# Patient Record
Sex: MALE | Race: WHITE | NOT HISPANIC OR LATINO | Employment: STUDENT | URBAN - METROPOLITAN AREA
[De-identification: names, ages, dates, MRNs, and addresses within clinical notes are randomized per-mention and may not be internally consistent; named-entity substitution may affect disease eponyms.]

---
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
## 2023-12-29 ENCOUNTER — LAB VISIT (OUTPATIENT)
Dept: LAB | Facility: HOSPITAL | Age: 18
End: 2023-12-29
Payer: COMMERCIAL

## 2023-12-29 ENCOUNTER — OFFICE VISIT (OUTPATIENT)
Dept: ALLERGY | Facility: CLINIC | Age: 18
End: 2023-12-29
Payer: COMMERCIAL

## 2023-12-29 VITALS — HEIGHT: 70 IN | BODY MASS INDEX: 30.73 KG/M2 | WEIGHT: 214.69 LBS

## 2023-12-29 DIAGNOSIS — R06.02 SHORTNESS OF BREATH: ICD-10-CM

## 2023-12-29 DIAGNOSIS — H10.423 SIMPLE CHRONIC CONJUNCTIVITIS OF BOTH EYES: ICD-10-CM

## 2023-12-29 DIAGNOSIS — J31.0 CHRONIC RHINITIS: ICD-10-CM

## 2023-12-29 DIAGNOSIS — J31.0 CHRONIC RHINITIS: Primary | ICD-10-CM

## 2023-12-29 PROCEDURE — 36415 COLL VENOUS BLD VENIPUNCTURE: CPT | Mod: PO | Performed by: ALLERGY & IMMUNOLOGY

## 2023-12-29 PROCEDURE — 1160F RVW MEDS BY RX/DR IN RCRD: CPT | Mod: CPTII,S$GLB,, | Performed by: ALLERGY & IMMUNOLOGY

## 2023-12-29 PROCEDURE — 99999 PR PBB SHADOW E&M-EST. PATIENT-LVL III: ICD-10-PCS | Mod: PBBFAC,,, | Performed by: ALLERGY & IMMUNOLOGY

## 2023-12-29 PROCEDURE — 1160F PR REVIEW ALL MEDS BY PRESCRIBER/CLIN PHARMACIST DOCUMENTED: ICD-10-PCS | Mod: CPTII,S$GLB,, | Performed by: ALLERGY & IMMUNOLOGY

## 2023-12-29 PROCEDURE — 3008F PR BODY MASS INDEX (BMI) DOCUMENTED: ICD-10-PCS | Mod: CPTII,S$GLB,, | Performed by: ALLERGY & IMMUNOLOGY

## 2023-12-29 PROCEDURE — 86003 ALLG SPEC IGE CRUDE XTRC EA: CPT | Mod: 59 | Performed by: ALLERGY & IMMUNOLOGY

## 2023-12-29 PROCEDURE — 99999 PR PBB SHADOW E&M-EST. PATIENT-LVL III: CPT | Mod: PBBFAC,,, | Performed by: ALLERGY & IMMUNOLOGY

## 2023-12-29 PROCEDURE — 1159F PR MEDICATION LIST DOCUMENTED IN MEDICAL RECORD: ICD-10-PCS | Mod: CPTII,S$GLB,, | Performed by: ALLERGY & IMMUNOLOGY

## 2023-12-29 PROCEDURE — 1159F MED LIST DOCD IN RCRD: CPT | Mod: CPTII,S$GLB,, | Performed by: ALLERGY & IMMUNOLOGY

## 2023-12-29 PROCEDURE — 99204 OFFICE O/P NEW MOD 45 MIN: CPT | Mod: S$GLB,,, | Performed by: ALLERGY & IMMUNOLOGY

## 2023-12-29 PROCEDURE — 86003 ALLG SPEC IGE CRUDE XTRC EA: CPT | Performed by: ALLERGY & IMMUNOLOGY

## 2023-12-29 PROCEDURE — 99204 PR OFFICE/OUTPT VISIT, NEW, LEVL IV, 45-59 MIN: ICD-10-PCS | Mod: S$GLB,,, | Performed by: ALLERGY & IMMUNOLOGY

## 2023-12-29 PROCEDURE — 3008F BODY MASS INDEX DOCD: CPT | Mod: CPTII,S$GLB,, | Performed by: ALLERGY & IMMUNOLOGY

## 2023-12-29 NOTE — PROGRESS NOTES
Subjective:       Patient ID: Kedar Ness is a 18 y.o. male.    Chief Complaint:  Allergic Rhinitis  and Breathing Problem      19 yo man presents for new patient evaluation of allergies. He states if he does not take daily zyrtec he has congestion and SOB in AM, constant runny nose, sneeze, itchy watery eyes, itchy nose. He notices this more at home in [place], seems fine at college. He has always been triggered by cats but there is no cat at his home, his brother has cat but not been there in awhile. He has had issues since start of December when came H ome from college. Has some chest tightness and SOB in AM and at night when lying down. Only known trigger is cat. No food, insect or latex allergy. No H/O asthma or eczema. No other medical issues but is having arthralgias so seeing rheum. Not her medical issues. No H/O ENT surgery        Environmental History: see history section for home environment  Review of Systems   HENT:  Positive for congestion, postnasal drip, rhinorrhea and sneezing. Negative for ear pain, facial swelling, nosebleeds, sinus pressure, sinus pain and sore throat.    Eyes:  Positive for discharge and itching. Negative for redness.   Respiratory:  Positive for chest tightness and shortness of breath. Negative for cough and wheezing.    Skin:  Negative for color change and rash.        Objective:      Physical Exam  Vitals and nursing note reviewed.   Constitutional:       General: He is not in acute distress.     Appearance: Normal appearance. He is not ill-appearing.   HENT:      Nose: No rhinorrhea.   Eyes:      General:         Right eye: No discharge.         Left eye: No discharge.      Conjunctiva/sclera: Conjunctivae normal.   Pulmonary:      Effort: Pulmonary effort is normal. No respiratory distress.   Abdominal:      General: There is no distension.   Skin:     General: Skin is warm and dry.      Findings: No erythema or rash.   Neurological:      Mental Status: He is alert and  oriented to person, place, and time.   Psychiatric:         Mood and Affect: Mood normal.         Behavior: Behavior normal.         Laboratory:   none performed   Assessment:       1. Chronic rhinitis    2. Simple chronic conjunctivitis of both eyes    3. Shortness of breath         Plan:       Advised rhinitis and conjunctivitis can be allergic vs chronic non allergic, will send immunocaps to eval  Increase cetirizine to 10 mg BID, if not better consider adding INS and allergy eye drop  Phone review    I spent a total of 45 minutes on the day of the visit.  This includes face to face time and non-face to face time preparing to see the patient (eg, review of tests), obtaining and/or reviewing separately obtained history, documenting clinical information in the electronic or other health record, independently interpreting results and communicating results to the patient/family/caregiver, or care coordinator.

## 2024-01-03 LAB
A ALTERNATA IGE QN: <0.1 KU/L
A FUMIGATUS IGE QN: <0.1 KU/L
ALLERGEN CHAETOMIUM GLOBOSUM IGE: <0.1 KU/L
ALLERGEN WALNUT TREE IGE: <0.1 KU/L
ALLERGEN WHITE PINE TREE IGE: <0.1 KU/L
BAHIA GRASS IGE QN: <0.1 KU/L
BALD CYPRESS IGE QN: <0.1 KU/L
BERMUDA GRASS IGE QN: <0.1 KU/L
C HERBARUM IGE QN: <0.1 KU/L
C LUNATA IGE QN: <0.1 KU/L
CAT DANDER IGE QN: 12.4 KU/L
CHAETOMIUM GLOB. CLASS: NORMAL
COMMON RAGWEED IGE QN: <0.1 KU/L
COTTONWOOD IGE QN: <0.1 KU/L
D FARINAE IGE QN: 36 KU/L
D PTERONYSS IGE QN: 28.9 KU/L
DEPRECATED A ALTERNATA IGE RAST QL: NORMAL
DEPRECATED A FUMIGATUS IGE RAST QL: NORMAL
DEPRECATED BAHIA GRASS IGE RAST QL: NORMAL
DEPRECATED BALD CYPRESS IGE RAST QL: NORMAL
DEPRECATED BERMUDA GRASS IGE RAST QL: NORMAL
DEPRECATED C HERBARUM IGE RAST QL: NORMAL
DEPRECATED C LUNATA IGE RAST QL: NORMAL
DEPRECATED CAT DANDER IGE RAST QL: ABNORMAL
DEPRECATED COMMON RAGWEED IGE RAST QL: NORMAL
DEPRECATED COTTONWOOD IGE RAST QL: NORMAL
DEPRECATED D FARINAE IGE RAST QL: ABNORMAL
DEPRECATED D PTERONYSS IGE RAST QL: ABNORMAL
DEPRECATED DOG DANDER IGE RAST QL: ABNORMAL
DEPRECATED ELDER IGE RAST QL: NORMAL
DEPRECATED ENGL PLANTAIN IGE RAST QL: NORMAL
DEPRECATED HORSE DANDER IGE RAST QL: ABNORMAL
DEPRECATED JOHNSON GRASS IGE RAST QL: NORMAL
DEPRECATED LONDON PLANE IGE RAST QL: NORMAL
DEPRECATED MUGWORT IGE RAST QL: NORMAL
DEPRECATED P NOTATUM IGE RAST QL: NORMAL
DEPRECATED PECAN/HICK TREE IGE RAST QL: NORMAL
DEPRECATED ROACH IGE RAST QL: ABNORMAL
DEPRECATED S ROSTRATA IGE RAST QL: NORMAL
DEPRECATED SALTWORT IGE RAST QL: NORMAL
DEPRECATED SILVER BIRCH IGE RAST QL: NORMAL
DEPRECATED TIMOTHY IGE RAST QL: NORMAL
DEPRECATED WEST RAGWEED IGE RAST QL: NORMAL
DEPRECATED WHITE OAK IGE RAST QL: NORMAL
DEPRECATED WILLOW IGE RAST QL: NORMAL
DOG DANDER IGE QN: 1.61 KU/L
ELDER IGE QN: <0.1 KU/L
ENGL PLANTAIN IGE QN: <0.1 KU/L
HORSE DANDER IGE QN: 2.73 KU/L
JOHNSON GRASS IGE QN: <0.1 KU/L
LONDON PLANE IGE QN: <0.1 KU/L
MUGWORT IGE QN: <0.1 KU/L
P NOTATUM IGE QN: <0.1 KU/L
PECAN/HICK TREE IGE QN: <0.1 KU/L
ROACH IGE QN: 0.19 KU/L
S ROSTRATA IGE QN: <0.1 KU/L
SALTWORT IGE QN: <0.1 KU/L
SILVER BIRCH IGE QN: <0.1 KU/L
TIMOTHY IGE QN: <0.1 KU/L
WALNUT TREE CLASS: NORMAL
WEST RAGWEED IGE QN: <0.1 KU/L
WHITE OAK IGE QN: <0.1 KU/L
WHITE PINE CLASS: NORMAL
WILLOW IGE QN: <0.1 KU/L

## 2024-01-08 ENCOUNTER — TELEPHONE (OUTPATIENT)
Dept: ALLERGY | Facility: CLINIC | Age: 19
End: 2024-01-08
Payer: COMMERCIAL

## 2024-01-08 NOTE — TELEPHONE ENCOUNTER
Please let pt know he is allergic to cat, dog, horse and dust mites.  He needs dust mote avoidance-  Dust mites are microscopic insects, so small you cant see them and they cant bite but they live in our beds, carpet and upholstered furniture. They have nothing to do with cleanliness. We are most exposed by our beds, at night breathe this allergen in all night. She would benefit from Dust mite avoidance - zippered covers over pillows, mattress and box springs.  Can purchase at [employer], [employer], [employer], or www.[employer].   Place sheets over allergy covers   Wash sheets in hot water weekly

## 2024-06-18 ENCOUNTER — TELEPHONE (OUTPATIENT)
Dept: FAMILY MEDICINE | Facility: CLINIC | Age: 19
End: 2024-06-18
Payer: COMMERCIAL

## 2024-06-18 NOTE — TELEPHONE ENCOUNTER
----- Message from Pro Shaw sent at 6/18/2024 10:34 AM CDT -----  Contact: Pt. Portal  .Type:  Sooner Apoointment Request    Caller is requesting a sooner appointment.  Caller declined first available appointment listed below.  Caller will not accept being placed on the waitlist and is requesting a message be sent to doctor.  Name of Caller:pt  When is the first available appointment?08/22/2024  Symptoms: annual  Would the patient rather a call back or a response via [facility]?  Call back  Best Call Back Number:[phone]  Additional Information: Rubi scheduled me for an appt in late aug but i go back to school around aug 5. can i come before then?   6/18/2024 - 8/9/2024

## 2024-06-18 NOTE — TELEPHONE ENCOUNTER
Attempted to reach out to pt in regards to get him schedule to an earlier appointment. Pt did not answer and was unable to leave voicemail

## 2024-07-01 ENCOUNTER — HOSPITAL ENCOUNTER (OUTPATIENT)
Dept: RADIOLOGY | Facility: HOSPITAL | Age: 19
Discharge: HOME | End: 2024-07-01
Attending: STUDENT IN AN ORGANIZED HEALTH CARE EDUCATION/TRAINING PROGRAM
Payer: COMMERCIAL

## 2024-07-01 ENCOUNTER — OFFICE VISIT (OUTPATIENT)
Dept: ORTHOPEDICS | Facility: CLINIC | Age: 19
End: 2024-07-01
Payer: COMMERCIAL

## 2024-07-01 VITALS — WEIGHT: 214.75 LBS | HEIGHT: 70 IN | BODY MASS INDEX: 30.74 KG/M2

## 2024-07-01 DIAGNOSIS — M25.551 BILATERAL HIP PAIN: ICD-10-CM

## 2024-07-01 DIAGNOSIS — M25.551 BILATERAL HIP PAIN: Primary | ICD-10-CM

## 2024-07-01 DIAGNOSIS — G89.29 CHRONIC NECK PAIN: ICD-10-CM

## 2024-07-01 DIAGNOSIS — M25.511 BILATERAL SHOULDER PAIN, UNSPECIFIED CHRONICITY: ICD-10-CM

## 2024-07-01 DIAGNOSIS — M25.511 BILATERAL SHOULDER PAIN, UNSPECIFIED CHRONICITY: Primary | ICD-10-CM

## 2024-07-01 DIAGNOSIS — M25.512 BILATERAL SHOULDER PAIN, UNSPECIFIED CHRONICITY: Primary | ICD-10-CM

## 2024-07-01 DIAGNOSIS — M25.552 BILATERAL HIP PAIN: Primary | ICD-10-CM

## 2024-07-01 DIAGNOSIS — M54.2 CHRONIC NECK PAIN: ICD-10-CM

## 2024-07-01 DIAGNOSIS — M25.512 BILATERAL SHOULDER PAIN, UNSPECIFIED CHRONICITY: ICD-10-CM

## 2024-07-01 DIAGNOSIS — M24.80 GENERALIZED ARTICULAR HYPERMOBILITY: Primary | ICD-10-CM

## 2024-07-01 DIAGNOSIS — M25.552 BILATERAL HIP PAIN: ICD-10-CM

## 2024-07-01 PROCEDURE — 73030 X-RAY EXAM OF SHOULDER: CPT | Mod: 26,50,, | Performed by: RADIOLOGY

## 2024-07-01 PROCEDURE — 73521 X-RAY EXAM HIPS BI 2 VIEWS: CPT | Mod: TC,PO

## 2024-07-01 PROCEDURE — 3008F BODY MASS INDEX DOCD: CPT | Mod: CPTII,S$GLB,, | Performed by: STUDENT IN AN ORGANIZED HEALTH CARE EDUCATION/TRAINING PROGRAM

## 2024-07-01 PROCEDURE — 1159F MED LIST DOCD IN RCRD: CPT | Mod: CPTII,S$GLB,, | Performed by: STUDENT IN AN ORGANIZED HEALTH CARE EDUCATION/TRAINING PROGRAM

## 2024-07-01 PROCEDURE — 99204 OFFICE O/P NEW MOD 45 MIN: CPT | Mod: S$GLB,,, | Performed by: STUDENT IN AN ORGANIZED HEALTH CARE EDUCATION/TRAINING PROGRAM

## 2024-07-01 PROCEDURE — 1160F RVW MEDS BY RX/DR IN RCRD: CPT | Mod: CPTII,S$GLB,, | Performed by: STUDENT IN AN ORGANIZED HEALTH CARE EDUCATION/TRAINING PROGRAM

## 2024-07-01 PROCEDURE — 73030 X-RAY EXAM OF SHOULDER: CPT | Mod: TC,50,PO

## 2024-07-01 PROCEDURE — 99999 PR PBB SHADOW E&M-EST. PATIENT-LVL IV: CPT | Mod: PBBFAC,,, | Performed by: STUDENT IN AN ORGANIZED HEALTH CARE EDUCATION/TRAINING PROGRAM

## 2024-07-01 PROCEDURE — 73521 X-RAY EXAM HIPS BI 2 VIEWS: CPT | Mod: 26,,, | Performed by: RADIOLOGY

## 2024-07-01 NOTE — PATIENT INSTRUCTIONS
Assessment:  Kedar Ness is a 19 y.o. male No chief complaint on file.      Encounter Diagnoses   Name Primary?    Generalized articular hypermobility Yes    Chronic neck pain         Plan:  Referral to Back and Sp[facility] in [place]  Referral to Genetics Department  Referral to physical therapy at [facility] [place]  An order for Physical Therapy within the [facility] system was placed today.  Please expect a call from our Centralized Scheduling number, [phone].  If you do not hear from them in the next few days, please call our local PT department directly at [phone].    Patient may use over the counter lidocaine patches as needed for pain.  Patient may use heat as needed for pain every 2 hours for 15 minutes  Apply topical diclofenac (Voltaren) up to 4 times a day to the affected area.  It can be bought over the counter at any local pharmacy.    Follow up as needed        A referral has been placed to our Back and Spine Department. A member of their staff will reach out to you to get an appointment scheduled with the appropriate provider. You may also contact their department using the contact numbers below:    [place]: [phone]  [place]: [phone]  [facility]: [phone]    If you would like more information on [facility] department, please use the following link: Back [facility] Spine      Follow-up: as needed.    Thank you for choosing [facility] [place] [facility] and Dr. Ronan Rees for your orthopedic & sports medicine care. It is our goal to provide you with exceptional care that will help keep you healthy, active, and get you back in the game.    Please do not hesitate to reach out to us via email, phone, or MyChart with any questions, concerns, or feedback.    If you felt that you received exemplary care today, please consider leaving us feedback on [employer] at:  [email or web address]    If you are experiencing  pain/discomfort ,or have questions after 5pm and would like to be connected to the [facility] [place] on-call team, please call this number and specify which Sports Medicine provider is treating you: [phone]

## 2024-07-01 NOTE — PROGRESS NOTES
Patient ID: Kedar Ness  YOB: 2005  MRN: [id number]    Chief Complaint: bilateral shoulder, hip, upper back jaya      Referred By: self    History of Present Illness: Kedar Ness is a right-hand dominant 19 y.o. male who presents today with shoulder, hip pain.  New pt in today for upper neck and shoulder pain that has been ongoing for several years. Feels like he's straining his back when reaching over to grab things. Upper back hurts all the time. Denies any injuries or pain today.  No specific event, fall, injury or trauma. No specific dislocation events. Active male, lifts weights     The patient is active in none.  Occupation: College student      Past Medical History:   Past Medical History:   Diagnosis Date    Anxiety     Central auditory processing disorder      History reviewed. No pertinent surgical history.  Family History   Problem Relation Name Age of Onset    Hyperlipidemia Mother      Strabismus Father      Heart disease Father      Alpha-1 antitrypsin deficiency Brother      Amblyopia Neg Hx      Blindness Neg Hx      Cancer Neg Hx      Cataracts Neg Hx      Diabetes Neg Hx      Glaucoma Neg Hx      Hypertension Neg Hx      Macular degeneration Neg Hx      Retinal detachment Neg Hx      Stroke Neg Hx      Thyroid disease Neg Hx       Social History     Socioeconomic History    Marital status: Single   Tobacco Use    Smoking status: Never     Passive exposure: Never    Smokeless tobacco: Never   Substance and Sexual Activity    Alcohol use: No     Comment: socially    Drug use: No    Sexual activity: Never   Social History Narrative    Lives with parents.     Senior [facility]     College : [employer] : Biology : Neuroscience : Research     Hobbies : None        Review of patient's allergies indicates:  No Known Allergies    Physical Exam:   Body mass index is 30.81 kg/m².    GENERAL: Well appearing, in no acute distress.  HEAD: Normocephalic and atraumatic.  ENT: External ears and nose  grossly normal.  EYES: EOMI bilaterally  PULMONARY: Respirations are grossly even and non-labored.  NEURO: Awake, alert, and oriented x 3.  SKIN: No obvious rashes appreciated.  PSYCH: Mood & affect are appropriate.    Detailed MSK exam:     Left shoulder exam:   -ROM: abduction 130, forward flexion 130, external rotation 90, internal rotation 80  -empty can test negative, resisted ER negative, belly press negative  -padron test negative, neers test negative, whipple test negative  -biceps load test negative, yerguson test negative, [place]'s test negative  -sensation intact, pulses 2+  -TTP: none    Right shoulder exam:   -ROM: abduction 130, forward flexion 130, external rotation 90, internal rotation 80  -empty can test negative, resisted ER negative, belly press negative  -padron test negative, neers test negative, whipple test negative  -biceps load test negative, yerguson test negative, [place]'s test negative  -sensation intact, pulses 2+  -TTP: none      Imaging:  X-Ray Foot Complete Left  Narrative: EXAMINATION:  XR FOOT COMPLETE 3 VIEW LEFT    CLINICAL HISTORY:  numb left big toe;.  Anesthesia of skin    TECHNIQUE:  AP, lateral and oblique views of the left foot were performed.    COMPARISON:  None    FINDINGS:  No fracture or dislocation.  Lisfranc articulation is congruent.  Cartilage spaces are maintained.  Pes planus deformity noted.  Soft tissues are unremarkable.  Impression: No radiographically evident acute osseous abnormality.    Electronically signed by: Urbano Ha  Date:    11/05/2019  Time:    17:07        Relevant imaging results were reviewed and interpreted by me and per my read shows no acute abnormalities.  This was discussed with the patient and / or family today.     Assessment:  Kedar Ness is a 19 y.o. male presenting with chronic neck/upper back pain.   History, physical and radiographs are consistent with a likely diagnosis of chronic neck pain, hypermobility.   Plan: referral  to back and spine as well as genetics. PT referral. Continue conservative management for pain.   Follow up as needed. All questions answered.      Generalized articular hypermobility  -     Ambulatory referral/consult to Physical/Occupational Therapy; Future; Expected date: 07/08/2024  -     Ambulatory referral/consult to Genetics; Future; Expected date: 07/08/2024  -     Ambulatory referral/consult to Back & Spine Clinic; Future; Expected date: 07/08/2024    Chronic neck pain  -     Ambulatory referral/consult to Physical/Occupational Therapy; Future; Expected date: 07/08/2024  -     Ambulatory referral/consult to Genetics; Future; Expected date: 07/08/2024  -     Ambulatory referral/consult to Back & Spine Clinic; Future; Expected date: 07/08/2024             A copy of today's visit note has been sent to the referring provider.     Electronically signed:  Ronan Rees MD, MPH  07/01/2024  12:18 PM

## 2024-07-22 ENCOUNTER — OFFICE VISIT (OUTPATIENT)
Dept: PAIN MEDICINE | Facility: CLINIC | Age: 19
End: 2024-07-22
Payer: COMMERCIAL

## 2024-07-22 VITALS
BODY MASS INDEX: 30.77 KG/M2 | HEIGHT: 70 IN | WEIGHT: 214.94 LBS | SYSTOLIC BLOOD PRESSURE: 114 MMHG | HEART RATE: 55 BPM | DIASTOLIC BLOOD PRESSURE: 70 MMHG

## 2024-07-22 DIAGNOSIS — G89.29 CHRONIC NECK PAIN: ICD-10-CM

## 2024-07-22 DIAGNOSIS — M24.80 GENERALIZED ARTICULAR HYPERMOBILITY: ICD-10-CM

## 2024-07-22 DIAGNOSIS — M54.2 CHRONIC NECK PAIN: ICD-10-CM

## 2024-07-22 PROCEDURE — 3078F DIAST BP <80 MM HG: CPT | Mod: CPTII,S$GLB,, | Performed by: PHYSICIAN ASSISTANT

## 2024-07-22 PROCEDURE — 99203 OFFICE O/P NEW LOW 30 MIN: CPT | Mod: S$GLB,,, | Performed by: PHYSICIAN ASSISTANT

## 2024-07-22 PROCEDURE — 3008F BODY MASS INDEX DOCD: CPT | Mod: CPTII,S$GLB,, | Performed by: PHYSICIAN ASSISTANT

## 2024-07-22 PROCEDURE — 99999 PR PBB SHADOW E&M-EST. PATIENT-LVL III: CPT | Mod: PBBFAC,,, | Performed by: PHYSICIAN ASSISTANT

## 2024-07-22 PROCEDURE — 3074F SYST BP LT 130 MM HG: CPT | Mod: CPTII,S$GLB,, | Performed by: PHYSICIAN ASSISTANT

## 2024-07-22 NOTE — PROGRESS NOTES
[facility] New Patient Evaluation      Referred by: Dr. Ronan Rees    PCP: Bill Dubon MD    CC:   Chief Complaint   Patient presents with    Initial Visit     Generalized articular hypermobility [M24.80]; Chronic neck pain [M54.2, G89.29] mostly his upper and lower back and his neck area. Pain is getting worse as the wks and days.            HPI:   Kedar Ness is a 19 y.o. year old male patient who has a past medical history of Anxiety and Central auditory processing disorder. He presents in referral from Dr. Ronan Rees for neck and back pain.  It is pain is mostly felt midline midthoracic region.  It traveled superiorly up to the upper thoracic and cervical region.  Pain travels inferiorly to the lower lumbar region.  Denies any radicular chest wall pain.  He denies any persistent radicular arm or leg pain, numbness, tingling.  He does not generally take any medications for the pain experienced.  He did undergo physical therapy in 2019 and has continued with a regular home exercise program since then.  Was recently referred back to physical therapy, but has not started yet.  Has not had any other treatments.  Primary care has also referred him to a  for evaluation of possible hypermobility/EDS.    Denies bowel/ bladder incontinence.    Past and current medications:  Antineuropathics:  NSAIDs:  Antidepressants:  Muscle relaxers:  Opioids:  Antiplatelets/Anticoagulants:    Physical Therapy/ Chiropractic care:  2019 PT  Home exercise  Recently referred to PT, but has not started yet    Pain Intervention History:  none    Past Spine Surgical History:  none        History:  No current outpatient medications on file.    Past Medical History:   Diagnosis Date    Anxiety     Central auditory processing disorder        No past surgical history on file.    Family History   Problem Relation Name Age of Onset    Hyperlipidemia Mother      Strabismus Father      Heart disease Father       "Alpha-1 antitrypsin deficiency Brother      Amblyopia Neg Hx      Blindness Neg Hx      Cancer Neg Hx      Cataracts Neg Hx      Diabetes Neg Hx      Glaucoma Neg Hx      Hypertension Neg Hx      Macular degeneration Neg Hx      Retinal detachment Neg Hx      Stroke Neg Hx      Thyroid disease Neg Hx         Social History     Socioeconomic History    Marital status: Single   Tobacco Use    Smoking status: Never     Passive exposure: Never    Smokeless tobacco: Never   Substance and Sexual Activity    Alcohol use: No     Comment: socially    Drug use: No    Sexual activity: Never   Social History Narrative    Lives with parents.     Senior [facility]     College : [employer] : Biology : Neuroscience : Research     Hobbies : None        Review of patient's allergies indicates:  No Known Allergies    Labs:  No results found for: "LABA1C", "HGBA1C"    No results found for: "WBC", "HGB", "HCT", "MCV", "PLT"        Review of Systems:  Nec k pain.  Thoracic back pain.  Lumbar back pain. .  Balance of review of systems is negative.    Physical Exam:  Vitals:    07/22/24 1312   BP: 114/70   Pulse: (!) 55   Weight: 97.5 kg (214 lb 15.2 oz)   Height: 5' 10" (1.778 m)   PainSc:   3   PainLoc: Back     Body mass index is 30.84 kg/m².    Pain disability index:      7/22/2024     1:13 PM   Last 3 PDI Scores   Pain Disability Index (PDI) 15       Gen: NAD  Psych: mood appropriate for given condition  HEENT: eyes anicteric   CV: RRR, 2+ radial pulse  Respiratory: non-labored, no signs of respiratory distress  Abd: non-distended  Skin: warm, dry and intact.  Gait: Able to heel walk, toe walk. No antalgic gait.     Coordination:   Tandem walking coordination: normal    Cervical spine: ROM is full in flexion, extension and lateral rotation without increased pain.  Spurling's maneuver causes no neck pain to either side.  Myofascial exam: No Tenderness to palpation across cervical paraspinous region bilaterally.    Lumbar spine:  Lumbar spine: ROM is " full with flexion extension and oblique extension with no increased pain.    Shade's test causes no increased pain on either side.    Supine straight leg raise is negative bilaterally.    Internal and external rotation of the hip causes no increased pain on either side.  Myofascial exam: No tenderness to palpation across lumbar paraspinous muscles. No tenderness to palpation over the bilateral greater trochanters and bilateral SI joint    Sensory:  Intact and symmetrical to light touch in C4-T1 dermatomes bilaterally. Intact and symmetrical to light touch in L1-S1 dermatomes bilaterally.    Motor:    Right Left   C4 Shoulder Abduction  5  5   C5 Elbow Flexion    5  5   C6 Wrist Extension  5  5   C7 Elbow Extension   5  5   C8/T1 Hand Intrinsics   5  5        Right Left   L2/3 Iliacus Hip flexion  5  5   L3/4 Qudratus Femoris Knee Extension  5  5   L4/5 Tib Anterior Ankle Dorsiflexion   5  5   L5/S1 Extensor Hallicus Longus Great toe extension  5  5   S1/S2 Gastroc/Soleus Plantar Flexion  5  5      Right Left   Triceps DTR 2+ 2+   Biceps DTR 2+ 2+   Brachioradialis DTR 2+ 2+   Patellar DTR 2+ 2+   Achilles DTR 2+ 2+   Raines Absent  Absent   Clonus Absent Absent   Babinski Absent Absent       Imaging:    Xray bilateral hips 7/1/24:  Joint spaces are relatively well-preserved. No acute fracture or dislocation. No acute bony abnormality.     Xray bilateral shoulder 7/1/24:  Within normal limits.  No fracture, dislocation or arthritic change.       Assessment:   Kedar Ness is a 19 y.o. year old male patient who has a past medical history of Anxiety and Central auditory processing disorder. He presents in referral from Dr. Ronan Rees for midline midthoracic back pain with radiation to the neck and lower lumbar region.  No radiculopathy.  No neurological deficits.  Pain pattern and exam most consistent with myofascial/mechanical back pain.    Plan:  - to help with myofascial/mechanical thoracic, cervical,  lumbar region pain, strongly recommend good posture and regular daily exercise.  Swimming, walking, stretching, biking or the best forms of exercise and strongly encouraged.  I do recommend he consider further physical therapy.  He last went in 2019 and could benefit from another course of therapy at this time.  He is interested in further PT and states he will schedule.  - he was recently referred to a  for evaluation of hypermobility/possible UDS.  He should keep this referral.  If you would like further assessment, 2nd opinion, he could see the [facility] hypermobility clinic.    Problem List Items Addressed This Visit    None  Visit Diagnoses       Generalized articular hypermobility        Chronic neck pain                Follow Up: RTC as needed    : Reviewed and consistent with medication use as prescribed.    Thank you for referring this interesting patient, and I look forward to continuing to collaborate in his care.        Kitty Acosta PA-C  [facility]

## 2024-07-29 NOTE — PROGRESS NOTES
[facility]  Physical Therapy Initial Evaluation    Name: Kedar Ness  Clinic Number: [id number]    Therapy Diagnosis: No diagnosis found.  Physician: Ronan Rees MD    Physician Orders: PT Eval and Treat   Medical Diagnosis from Referral:  Generalized articular hypermobility [M24.80], Chronic neck pain   Evaluation Date: 7/30/2024  Authorization Period Expiration: 7/1/25  Plan of Care Expiration: 9/23/24  Visit # / Visits authorized: 1/ 1  FOTO: 63; goal: 72 by visit 10    Time In: 1:10  Time Out: 1:55  Total Billable Time: 45 minutes    Precautions: Standard    Subjective   Date of onset: 1-2 years, but worse more recently  History of current condition - Kedar reports: he's had back pain for a couple of years, but worse more recently.  He states he has pain with looking down to do dishes and look at phone.    Weight lifting: no overhead press due to shoulder pain, lat stretches     Medical History:   Past Medical History:   Diagnosis Date    Anxiety     Central auditory processing disorder        Surgical History:   Kedar Ness  has no past surgical history on file.    Medications:   Kedar currently has no medications in their medication list.    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)  Prior Level of Function: I with ADLs  Current Level of Function: cervical flexion for washing dishes and reading, cervical extension    Pain:  Current 1/10, worst 5/10, best 0/10   Location:  central thoracic spine and radiates to neck and lower back  Description: Aching  Aggravating Factors: cervical flexion and extension  Easing Factors: bracing with UE and relaxing    Pts goals: decreased back pain, learn HEP to prevent pain    Objective     Observation: pt is pleasant and  cooperative    Posture: fwd head, rounded shoulders, sloped shoulders    Cervical Range of Motion:    Degrees Pain   Flexion 60 no     Extension 70 Pain to neck     Right Rotation 62 no     Left Rotation 58 no     Right Side Bending 44 no   Left Side Bending 42 no      Shoulder Range of Motion:   Shoulder Right Left   Flexion wfl wfl   Abduction wfl wfl   ER Contra scap Contra scap   IR Contra scap Contra scap     Strength:  Cervical MMT   Flexion 4+/5   Extension 5/5   Right Side Bend 5/5   Left Side Bend 5/5     Upper Extremity Strength  (R) UE  (L) UE    Shoulder flexion: 5/5 Shoulder flexion: 5/5   Shoulder Abduction: 5/5 Shoulder abduction: 5/5   Shoulder ER 5/5 Shoulder ER 5/5   Shoulder IR 5/5 Shoulder IR 5/5   Lower Trap 4-/5 Lower Trap 4/5   Middle Trap 4/5 Middle Trap 4+/5   Rhomboids 5/5 Rhomboids 4+/5     Thoracic:   Flexion WNL, no pain  Ext: 75%, pain to T5  R SB: WNL, but pulling to L side  L SB: WNL      Special Tests:  Sharp-Jean Pierre -   Lateral Flexion Alar Ligament intact       Joint Mobility: hypermobility throughout cspine      Palpation: tightness to B UT and levator scaps      Sensation: grossly intact to light touch      Intake Outcome Measure for FOTO neck Survey  Therapist reviewed FOTO scores for Kedar Ness on 7/30/2024.  FOTO report- see Media section or FOTO account episode details.  Intake Score : 63%       TREATMENT   Treatment Time In: 1:40  Treatment Time Out: 1:55  Total Treatment time separate from Evaluation: 15 minutes    Kedar participated in neuromuscular re-education activities to improve: Kinesthetic, Sense, Proprioception, and Posture for 15 minutes. The following activities were included:  Seated cervical retraction   Prone sh ext off EOM 3# x20  Prone horiz abd EOM 3# x20  Prone Y x10  Sh ext 10# x15 on CC    May add: Bruegger's, scap clocks, lateral wall walk, pec stretch    Home Exercises and Patient Education Provided    Education provided:   - role of PT  -importance  of periscap/postural strengthening  -postural education for sitting with upright posture  Pt gave verbal understanding to all education provided     Written Home Exercises Provided: yes.  Exercises were reviewed and Kedar was able to demonstrate them prior to the end of the session.  Kedar demonstrated good  understanding of the education provided.     See EMR under Patient Instructions for exercises provided  7/30/24 .    Assessment   Kedar is a 19 y.o. male referred to outpatient Physical Therapy with a medical diagnosis of  Generalized articular hypermobility [M24.80], Chronic neck pain . Pt presents with thoracic back pain and neck pain, decreased postural strength and stabilization.  He tolerated periscap exercises well, but muscle fatigue noted.  He will benefit from postural strengthening, stabilization, core strengthening, postural education.    Pt prognosis is Good.   Pt will benefit from skilled outpatient Physical Therapy to address the deficits stated above and in the chart below, provide pt/family education, and to maximize pt's level of independence.     Plan of care discussed with patient: Yes  Pt's spiritual, cultural and educational needs considered and patient is agreeable to the plan of care and goals as stated below:     Anticipated Barriers for therapy: none    Medical Necessity is demonstrated by the following  History  Co-morbidities and personal factors that may impact the plan of care [] LOW: no personal factors / co-morbidities  [x] MODERATE: 1-2 personal factors / co-morbidities  [] HIGH: 3+ personal factors / co-morbidities    Moderate / High Support Documentation:   Co-morbidities affecting plan of care: possible EDS    Personal Factors:   no deficits     Examination  Body Structures and Functions, activity limitations and participation restrictions that may impact the plan of care [] LOW: addressing 1-2 elements  [x] MODERATE: 3+ elements  [] HIGH: 4+ elements (please support  below)    Moderate / High Support Documentation: pain, decreased strength, decreased thoracic extension ROM     Clinical Presentation [x] LOW: stable  [] MODERATE: Evolving  [] HIGH: Unstable     Decision Making/ Complexity Score: low         GOALS: Short Term Goals: 4 weeks (progressing, not met)  1.Report decreased back pain  <   / =  3  /10 at its worst  to increase tolerance for ADLs  2. Pt will demonstrate understanding of proper sitting posture for increased ease with ADLs.  3. Increased R UE strength by 1/3 muscle grade in all deficient planes to increase tolerance for ADL and work activities.  4.  Increased L UE strength by 1/3 muscle grade in all deficient planes to increase tolerance for ADL and work activities.  5. Pt to tolerate HEP to improve ROM and independence with ADL's  6. Increased strength  for lower traps/middle traps/rhomboids by 1/3 muscle grade each to increase tolerance for ADL and improve posture.    Long Term Goals: 6-8 weeks (progressing, not met)  1.Report decreased thoracic back pain  <   / =  2  /10  to increase tolerance for ADLs  2.pt will demonstrate understanding of proper form for exercises at gym to prevent pain.  3.Increased R UE strength by 1 muscle grade in all deficient planes  to increase tolerance for ADL and work activities.  4. Increased L UE strength by 1 muscle grade in all deficient planes  to increase tolerance for ADL and work activities.  5. Pt to be Independent with HEP to improve ROM and independence with ADL's  6. Increased MMT  for lower traps/middle traps/rhomboids by 1 muscle grade each to increase tolerance for ADL and improve posture.        Plan   Plan of care Certification: 7/30/2024 to 9/23/24.    Outpatient Physical Therapy 2 times weekly for 8 weeks to include the following interventions: Electrical Stimulation  , Gait Training, Manual Therapy, Moist Heat/ Ice, Neuromuscular Re-ed, Patient Education, Self Care, Therapeutic Activities, Therapeutic  Exercise, and dry needling .     Vicki Corrigan, PT

## 2024-07-30 ENCOUNTER — CLINICAL SUPPORT (OUTPATIENT)
Dept: REHABILITATION | Facility: HOSPITAL | Age: 19
End: 2024-07-30
Attending: STUDENT IN AN ORGANIZED HEALTH CARE EDUCATION/TRAINING PROGRAM
Payer: COMMERCIAL

## 2024-07-30 DIAGNOSIS — M54.2 CHRONIC NECK PAIN: ICD-10-CM

## 2024-07-30 DIAGNOSIS — M62.81 MUSCLE WEAKNESS: ICD-10-CM

## 2024-07-30 DIAGNOSIS — M24.80 GENERALIZED ARTICULAR HYPERMOBILITY: ICD-10-CM

## 2024-07-30 DIAGNOSIS — G89.29 CHRONIC MIDLINE THORACIC BACK PAIN: Primary | ICD-10-CM

## 2024-07-30 DIAGNOSIS — G89.29 CHRONIC NECK PAIN: ICD-10-CM

## 2024-07-30 DIAGNOSIS — M54.6 CHRONIC MIDLINE THORACIC BACK PAIN: Primary | ICD-10-CM

## 2024-07-30 PROCEDURE — 97112 NEUROMUSCULAR REEDUCATION: CPT | Mod: PN

## 2024-07-30 PROCEDURE — 97161 PT EVAL LOW COMPLEX 20 MIN: CPT | Mod: PN

## 2024-07-30 NOTE — PLAN OF CARE
[facility]  Physical Therapy Initial Evaluation    Name: Kedar Ness  Clinic Number: [id number]    Therapy Diagnosis: No diagnosis found.  Physician: Ronan Rees MD    Physician Orders: PT Eval and Treat   Medical Diagnosis from Referral:  Generalized articular hypermobility [M24.80], Chronic neck pain   Evaluation Date: 7/30/2024  Authorization Period Expiration: 7/1/25  Plan of Care Expiration: 9/23/24  Visit # / Visits authorized: 1/ 1  FOTO: 63; goal: 72 by visit 10    Time In: 1:10  Time Out: 1:55  Total Billable Time: 45 minutes    Precautions: Standard    Subjective   Date of onset: 1-2 years, but worse more recently  History of current condition - Kedar reports: he's had back pain for a couple of years, but worse more recently.  He states he has pain with looking down to do dishes and look at phone.    Weight lifting: no overhead press due to shoulder pain, lat stretches     Medical History:   Past Medical History:   Diagnosis Date    Anxiety     Central auditory processing disorder        Surgical History:   Kedar Ness  has no past surgical history on file.    Medications:   Kedar currently has no medications in their medication list.    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)  Prior Level of Function: I with ADLs  Current Level of Function: cervical flexion for washing dishes and reading, cervical extension    Pain:  Current 1/10, worst 5/10, best 0/10   Location:  central thoracic spine and radiates to neck and lower back  Description: Aching  Aggravating Factors: cervical flexion and extension  Easing Factors: bracing with UE and relaxing    Pts goals: decreased back pain, learn HEP to prevent pain    Objective     Observation: pt is pleasant and  cooperative    Posture: fwd head, rounded shoulders, sloped shoulders    Cervical Range of Motion:    Degrees Pain   Flexion 60 no     Extension 70 Pain to neck     Right Rotation 62 no     Left Rotation 58 no     Right Side Bending 44 no   Left Side Bending 42 no      Shoulder Range of Motion:   Shoulder Right Left   Flexion wfl wfl   Abduction wfl wfl   ER Contra scap Contra scap   IR Contra scap Contra scap     Strength:  Cervical MMT   Flexion 4+/5   Extension 5/5   Right Side Bend 5/5   Left Side Bend 5/5     Upper Extremity Strength  (R) UE  (L) UE    Shoulder flexion: 5/5 Shoulder flexion: 5/5   Shoulder Abduction: 5/5 Shoulder abduction: 5/5   Shoulder ER 5/5 Shoulder ER 5/5   Shoulder IR 5/5 Shoulder IR 5/5   Lower Trap 4-/5 Lower Trap 4/5   Middle Trap 4/5 Middle Trap 4+/5   Rhomboids 5/5 Rhomboids 4+/5     Thoracic:   Flexion WNL, no pain  Ext: 75%, pain to T5  R SB: WNL, but pulling to L side  L SB: WNL      Special Tests:  Sharp-Jean Pierre -   Lateral Flexion Alar Ligament intact       Joint Mobility: hypermobility throughout cspine      Palpation: tightness to B UT and levator scaps      Sensation: grossly intact to light touch      Intake Outcome Measure for FOTO neck Survey  Therapist reviewed FOTO scores for Kedar Ness on 7/30/2024.  FOTO report- see Media section or FOTO account episode details.  Intake Score : 63%       TREATMENT   Treatment Time In: 1:40  Treatment Time Out: 1:55  Total Treatment time separate from Evaluation: 15 minutes    Kedar participated in neuromuscular re-education activities to improve: Kinesthetic, Sense, Proprioception, and Posture for 15 minutes. The following activities were included:  Seated cervical retraction   Prone sh ext off EOM 3# x20  Prone horiz abd EOM 3# x20  Prone Y x10  Sh ext 10# x15 on CC    May add: Bruegger's, scap clocks, lateral wall walk, pec stretch    Home Exercises and Patient Education Provided    Education provided:   - role of PT  -importance  of periscap/postural strengthening  -postural education for sitting with upright posture  Pt gave verbal understanding to all education provided     Written Home Exercises Provided: yes.  Exercises were reviewed and Kedar was able to demonstrate them prior to the end of the session.  Kedar demonstrated good  understanding of the education provided.     See EMR under Patient Instructions for exercises provided 7/30/24.    Assessment   Kedar is a 19 y.o. male referred to outpatient Physical Therapy with a medical diagnosis of  Generalized articular hypermobility [M24.80], Chronic neck pain . Pt presents with thoracic back pain and neck pain, decreased postural strength and stabilization.  He tolerated periscap exercises well, but muscle fatigue noted.  He will benefit from postural strengthening, stabilization, core strengthening, postural education.    Pt prognosis is Good.   Pt will benefit from skilled outpatient Physical Therapy to address the deficits stated above and in the chart below, provide pt/family education, and to maximize pt's level of independence.     Plan of care discussed with patient: Yes  Pt's spiritual, cultural and educational needs considered and patient is agreeable to the plan of care and goals as stated below:     Anticipated Barriers for therapy: none    Medical Necessity is demonstrated by the following  History  Co-morbidities and personal factors that may impact the plan of care [] LOW: no personal factors / co-morbidities  [x] MODERATE: 1-2 personal factors / co-morbidities  [] HIGH: 3+ personal factors / co-morbidities    Moderate / High Support Documentation:   Co-morbidities affecting plan of care: possible EDS    Personal Factors:   no deficits     Examination  Body Structures and Functions, activity limitations and participation restrictions that may impact the plan of care [] LOW: addressing 1-2 elements  [x] MODERATE: 3+ elements  [] HIGH: 4+ elements (please support  below)    Moderate / High Support Documentation: pain, decreased strength, decreased thoracic extension ROM     Clinical Presentation [x] LOW: stable  [] MODERATE: Evolving  [] HIGH: Unstable     Decision Making/ Complexity Score: low         GOALS: Short Term Goals: 4 weeks (progressing, not met)  1.Report decreased back pain  <   / =  3  /10 at its worst  to increase tolerance for ADLs  2. Pt will demonstrate understanding of proper sitting posture for increased ease with ADLs.  3. Increased R UE strength by 1/3 muscle grade in all deficient planes to increase tolerance for ADL and work activities.  4.  Increased L UE strength by 1/3 muscle grade in all deficient planes to increase tolerance for ADL and work activities.  5. Pt to tolerate HEP to improve ROM and independence with ADL's  6. Increased strength  for lower traps/middle traps/rhomboids by 1/3 muscle grade each to increase tolerance for ADL and improve posture.    Long Term Goals: 6-8 weeks (progressing, not met)  1.Report decreased thoracic back pain  <   / =  2  /10  to increase tolerance for ADLs  2.pt will demonstrate understanding of proper form for exercises at gym to prevent pain.  3.Increased R UE strength by 1 muscle grade in all deficient planes  to increase tolerance for ADL and work activities.  4. Increased L UE strength by 1 muscle grade in all deficient planes  to increase tolerance for ADL and work activities.  5. Pt to be Independent with HEP to improve ROM and independence with ADL's  6. Increased MMT  for lower traps/middle traps/rhomboids by 1 muscle grade each to increase tolerance for ADL and improve posture.        Plan   Plan of care Certification: 7/30/2024 to 9/23/24.    Outpatient Physical Therapy 2 times weekly for 8 weeks to include the following interventions: Electrical Stimulation  , Gait Training, Manual Therapy, Moist Heat/ Ice, Neuromuscular Re-ed, Patient Education, Self Care, Therapeutic Activities, Therapeutic  Exercise, and dry needling.     Vicki Corrigan, PT

## 2024-08-05 ENCOUNTER — CLINICAL SUPPORT (OUTPATIENT)
Dept: REHABILITATION | Facility: HOSPITAL | Age: 19
End: 2024-08-05
Payer: COMMERCIAL

## 2024-08-05 DIAGNOSIS — M62.81 MUSCLE WEAKNESS: ICD-10-CM

## 2024-08-05 DIAGNOSIS — M54.6 CHRONIC MIDLINE THORACIC BACK PAIN: Primary | ICD-10-CM

## 2024-08-05 DIAGNOSIS — G89.29 CHRONIC MIDLINE THORACIC BACK PAIN: Primary | ICD-10-CM

## 2024-08-05 PROCEDURE — 97112 NEUROMUSCULAR REEDUCATION: CPT | Mod: PN

## 2024-08-05 PROCEDURE — 97110 THERAPEUTIC EXERCISES: CPT | Mod: PN

## 2024-08-07 ENCOUNTER — CLINICAL SUPPORT (OUTPATIENT)
Dept: REHABILITATION | Facility: HOSPITAL | Age: 19
End: 2024-08-07
Payer: COMMERCIAL

## 2024-08-07 DIAGNOSIS — M54.6 CHRONIC MIDLINE THORACIC BACK PAIN: Primary | ICD-10-CM

## 2024-08-07 DIAGNOSIS — G89.29 CHRONIC MIDLINE THORACIC BACK PAIN: Primary | ICD-10-CM

## 2024-08-07 DIAGNOSIS — M62.81 MUSCLE WEAKNESS: ICD-10-CM

## 2024-08-07 PROCEDURE — 97112 NEUROMUSCULAR REEDUCATION: CPT | Mod: PN

## 2024-08-07 PROCEDURE — 97110 THERAPEUTIC EXERCISES: CPT | Mod: PN

## 2024-08-12 ENCOUNTER — CLINICAL SUPPORT (OUTPATIENT)
Dept: REHABILITATION | Facility: HOSPITAL | Age: 19
End: 2024-08-12
Payer: COMMERCIAL

## 2024-08-12 DIAGNOSIS — M62.81 MUSCLE WEAKNESS: ICD-10-CM

## 2024-08-12 DIAGNOSIS — M54.6 CHRONIC MIDLINE THORACIC BACK PAIN: Primary | ICD-10-CM

## 2024-08-12 DIAGNOSIS — G89.29 CHRONIC MIDLINE THORACIC BACK PAIN: Primary | ICD-10-CM

## 2024-08-12 PROCEDURE — 97110 THERAPEUTIC EXERCISES: CPT | Mod: PN,CQ

## 2024-08-12 PROCEDURE — 97112 NEUROMUSCULAR REEDUCATION: CPT | Mod: PN,CQ

## 2024-08-12 NOTE — PROGRESS NOTES
[facility]   Physical Therapy Treatment Note      Name: Kedar [facility]  Clinic Number: [id number]    Therapy Diagnosis:   Encounter Diagnoses   Name Primary?    Chronic midline thoracic back pain Yes    Muscle weakness        Physician: Ronan Rees MD    Visit Date: 8/12/2024    Physician Orders: PT Eval and Treat   Medical Diagnosis from Referral:  Generalized articular hypermobility [M24.80], Chronic neck pain   Evaluation Date: 7/30/2024  Authorization Period Expiration: 12/31/24  Plan of Care Expiration: 9/23/24  Visit # / Visits authorized: 3/ 20 +eval  FOTO: 63; goal: 72 by visit 10     Time In: 1:52  Time Out: 2:40  Total Billable Time: 48 minutes     Precautions: Standard    PTA Visit #: 1/5       Subjective     Patient reports: still has pain throughout each day, tends to worsen by end of day. Running and jogging still bother back as well as heavy squatting with the bar resting on his shoulders. Prolonged dishwashing bothersome as well.   He was compliant with home exercise program.  Response to previous treatment: did fine, no soreness  Functional change: too soon to tell.    Pain: 0/10  Location:  thoracic     Objective      Objective Measures updated at progress report unless specified.     Treatment     Kedar received the treatments listed below:      Manual therapy x 5 min to include:  STM to thoracic paraspinals    therapeutic exercises to develop strength, endurance, and posture for 25 minutes including:  Cat/camel x10  SL mod open books with hand of chest x5 ea - cue for deep breath in stretch  Seated cervical retraction x10, 5 sec hold  Seated upper trap stretch 3x20 sec ea  Prone B sh ext off 3# 2 x 10 (2nd set over green swiss ball for greater ROM)  Prone B horiz abd EOM 3# 2 x 10 (2nd set over green swiss ball for greater ROM)  Prone Y 2 x 10 (2nd set over green swiss ball for greater ROM)  NP-Prone plank 2x20 sec  NP--Sh ext 10# 2x15 on CC  pec stretch in  doorway 3x20 sec (1 at a time at sh height)      May add: scap protraction in plank on floor, hand step up/down on 4 in, squat W    neuromuscular re-education activities to improve: Kinesthetic, Sense, Proprioception, and Posture for 15 minutes. The following activities were included:  Quadruped alt sh flex x10 ea  NP - Plank on 12in box with scap protraction 2x10  NP - Shoulder taps on 18in box x20 ea  Bruegger's x15, 3 sec hold, RTB  Scap clocks lg Y loop x7 ea - cue for  scapular pressure into wall for stance arm  NP Lateral wall walk lg Y loop 2 laps ~10 ft  NP Serratus slides on wall x15  Seated slump nerve glides x 10 each - less tight following    Therapeutic activity to improve functional performance for a total of 10 minutes including:  Education on scapular awareness with bar positioning for squats - avoidance of UT compensation    Patient Education and Home Exercises       Education provided:   - avoid jogging for now to avoid further exacerbation of pain  Pt gave verbal understanding to all education provided     Written Home Exercises Provided: Pt instructed to continue prior HEP. Exercises were reviewed and Kedar was able to demonstrate them prior to the end of the session.  Kedar demonstrated good  understanding of the education provided. See Electronic Medical Record under Patient Instructions for exercises provided during therapy sessions    Assessment     Pt tolerating progression of exercises well, seems to better understand rationale of building strength/endurance to smaller muscle groups of the periscapular region. Occasional cues given with some exercises for serratus activation to reduce winging or upper trap/pectoral compensation. Greater awareness of need for upright posture and reducing forward head positioning and continued pec tightness. He will continue to benefit from PT for improved postural/periscap strengthening and stabilization.    Kedar Is progressing well towards his goals.    Patient prognosis is Excellent.     Patient will continue to benefit from skilled outpatient physical therapy to address the deficits listed in the problem list box on initial evaluation, provide pt/family education and to maximize pt's level of independence in the home and community environment.     Patient's spiritual, cultural and educational needs considered and pt agreeable to plan of care and goals.     Anticipated barriers to physical therapy: none    Goals:   Short Term Goals: 4 weeks (progressing, not met)  1.Report decreased back pain  <   / =  3  /10 at its worst  to increase tolerance for ADLs  2. Pt will demonstrate understanding of proper sitting posture for increased ease with ADLs.  3. Increased R UE strength by 1/3 muscle grade in all deficient planes to increase tolerance for ADL and work activities.  4.  Increased L UE strength by 1/3 muscle grade in all deficient planes to increase tolerance for ADL and work activities.  5. Pt to tolerate HEP to improve ROM and independence with ADL's  6. Increased strength  for lower traps/middle traps/rhomboids by 1/3 muscle grade each to increase tolerance for ADL and improve posture.     Long Term Goals: 6-8 weeks (progressing, not met)  1.Report decreased thoracic back pain  <   / =  2  /10  to increase tolerance for ADLs  2.pt will demonstrate understanding of proper form for exercises at gym to prevent pain.  3.Increased R UE strength by 1 muscle grade in all deficient planes  to increase tolerance for ADL and work activities.  4. Increased L UE strength by 1 muscle grade in all deficient planes  to increase tolerance for ADL and work activities.  5. Pt to be Independent with HEP to improve ROM and independence with ADL's  6. Increased MMT  for lower traps/middle traps/rhomboids by 1 muscle grade each to increase tolerance for ADL and improve posture.       Plan     Continue PT towards established goals.  Progress postural strength and stability as  tolerated.    Plan of care Certification: 7/30/2024 to 9/23/24.     Outpatient Physical Therapy 2 times weekly for 8 weeks to include the following interventions: Electrical Stimulation  , Gait Training, Manual Therapy, Moist Heat/ Ice, Neuromuscular Re-ed, Patient Education, Self Care, Therapeutic Activities, Therapeutic Exercise, and dry needling.     Kassidy Catherine, PTA

## 2024-08-13 NOTE — PROGRESS NOTES
[facility]   Discharge Summary and Physical Therapy Treatment Note      Name: Kedar [facility] Number: [id number]    Therapy Diagnosis:   Encounter Diagnoses   Name Primary?    Chronic midline thoracic back pain Yes    Muscle weakness        Physician: Ronan Rees MD    Visit Date: 8/14/2024    Physician Orders: PT Eval and Treat   Medical Diagnosis from Referral:  Generalized articular hypermobility [M24.80], Chronic neck pain   Evaluation Date: 7/30/2024  Authorization Period Expiration: 12/31/24  Plan of Care Expiration: 9/23/24  Visit # / Visits authorized: 4/ 20 +eval  FOTO: 63; goal: 72 by visit 10     Time In: 1:15  Time Out: 2:00  Total Billable Time: 45 minutes     Precautions: Standard    PTA Visit #: 1/5       Subjective     Patient reports: he is doing better with exercises at gym.  He is returning to college so he will not be able to attend PT.  He thinks he can self manage with HEP  He was compliant with home exercise program.  Response to previous treatment: did well  Functional change: Improved form and ease with shoulder exercises    Pain: 1/10  Location:  thoracic     Objective      Observation: pt is pleasant and cooperative     Posture: fwd head, rounded shoulders, sloped shoulders     Cervical Range of Motion:     Degrees Pain   Flexion 60 no      Extension 70 no      Right Rotation 62 no      Left Rotation 58 no      Right Side Bending 44 no   Left Side Bending 42 no      Shoulder Range of Motion:   Shoulder Right Left   Flexion wfl wfl   Abduction wfl wfl   ER Contra scap Contra scap   IR Contra scap Contra scap      Strength:  Cervical MMT   Flexion 5/5   Extension 5/5   Right Side Bend 5/5   Left Side Bend 5/5      Upper Extremity Strength  (R) UE   (L) UE     Shoulder flexion: 5/5 Shoulder flexion: 5/5   Shoulder Abduction: 5/5 Shoulder abduction: 5/5   Shoulder ER 5/5 Shoulder ER 5/5   Shoulder IR 5/5 Shoulder IR 5/5   Lower Trap 4/5 Lower Trap 4+/5    Middle Trap 4+/5 Middle Trap 5/5   Rhomboids 5/5 Rhomboids 5/5      Thoracic:   Flexion WNL, no pain  Ext: 75%, pain to T5  R SB: WNL, but pulling to L side  L SB: WNL        Special Tests:  Sharp-Jean Pierre -   Lateral Flexion Alar Ligament intact         Joint Mobility: hypermobility throughout cspine       Palpation: tightness to B UT and levator scaps       Sensation: grossly intact to light touch     Treatment     Kedar received the treatments listed below:      therapeutic exercises to develop strength, endurance, and posture for 30 minutes including:  Cat/camel x10  SL mod open books with hand of chest x5 ea - cue for deep breath in stretch  Seated cervical retraction x10, 5 sec hold  Seated upper trap stretch 3x20 sec ea  Prone B sh ext off 3# x20   Prone B horiz abd EOM 3# x20  Prone Y 2 x 10 (2nd set over green swiss ball for greater ROM)  Supine pec stretch over full roll x60 sec  Reviewed HEP and form for squats      neuromuscular re-education activities to improve: Kinesthetic, Sense, Proprioception, and Posture for 10 minutes. The following activities were included:  Quadruped alt sh flex x10 ea  Plank on 12in box with scap protraction x10  Shoulder taps on 18in box x20 ea  Bruegger's x15, 3 sec hold, RTB  Scap clocks lg Y loop x8 ea   Squat W row Y tubing 2x10      Patient Education and Home Exercises       Education provided:   - avoid jogging for now to avoid further exacerbation of pain  Pt gave verbal understanding to all education provided     Written Home Exercises Provided: Pt instructed to continue prior HEP. Exercises were reviewed and Kedar was able to demonstrate them prior to the end of the session.  Kedar demonstrated good  understanding of the education provided. See Electronic Medical Record under Patient Instructions for exercises provided during therapy sessions    Assessment     Pt has improved with increased strength and form for periscap muscles.   He was only able to attend 5  total visits due to returning to college.  He is safe for discharge to continue to self manage with HEP.    Patient's spiritual, cultural and educational needs considered and pt agreeable to plan of care and goals.     Anticipated barriers to physical therapy: none    Goals:   Short Term Goals: 4 weeks   1.Report decreased back pain  <   / =  3  /10 at its worst  to increase tolerance for ADLs (Met)  2. Pt will demonstrate understanding of proper sitting posture for increased ease with ADLs. (Met)  3. Increased R UE strength by 1/3 muscle grade in all deficient planes to increase tolerance for ADL and work activities. (Met)  4.  Increased L UE strength by 1/3 muscle grade in all deficient planes to increase tolerance for ADL and work activities. (Met)  5. Pt to tolerate HEP to improve ROM and independence with ADL's (met)  6. Increased strength  for lower traps/middle traps/rhomboids by 1/3 muscle grade each to increase tolerance for ADL and improve posture. (Met)     Long Term Goals: 6-8 weeks   1.Report decreased thoracic back pain  <   / =  2  /10  to increase tolerance for ADLs (met)  2.pt will demonstrate understanding of proper form for exercises at gym to prevent pain. (Met)  3.Increased R UE strength by 1 muscle grade in all deficient planes  to increase tolerance for ADL and work activities. (met, 5/5)   4. Increased L UE strength by 1 muscle grade in all deficient planes  to increase tolerance for ADL and work activities. (Met, 5/5)  5. Pt to be Independent with HEP to improve ROM and independence with ADL's  6. Increased MMT  for lower traps/middle traps/rhomboids by 1 muscle grade each to increase tolerance for ADL and improve posture. (Progressing, not met)       Plan     Pt discharged from PT to continue to self manage with HEP.    Vicki Corrigan, PT

## 2024-08-14 ENCOUNTER — CLINICAL SUPPORT (OUTPATIENT)
Dept: REHABILITATION | Facility: HOSPITAL | Age: 19
End: 2024-08-14
Payer: COMMERCIAL

## 2024-08-14 DIAGNOSIS — M62.81 MUSCLE WEAKNESS: ICD-10-CM

## 2024-08-14 DIAGNOSIS — M54.6 CHRONIC MIDLINE THORACIC BACK PAIN: Primary | ICD-10-CM

## 2024-08-14 DIAGNOSIS — G89.29 CHRONIC MIDLINE THORACIC BACK PAIN: Primary | ICD-10-CM

## 2024-08-14 PROCEDURE — 97110 THERAPEUTIC EXERCISES: CPT | Mod: PN

## 2024-08-14 PROCEDURE — 97112 NEUROMUSCULAR REEDUCATION: CPT | Mod: PN

## 2024-10-11 ENCOUNTER — PATIENT MESSAGE (OUTPATIENT)
Dept: FAMILY MEDICINE | Facility: CLINIC | Age: 19
End: 2024-10-11
Payer: COMMERCIAL

## 2024-10-25 ENCOUNTER — PATIENT MESSAGE (OUTPATIENT)
Dept: FAMILY MEDICINE | Facility: CLINIC | Age: 19
End: 2024-10-25
Payer: COMMERCIAL

## 2024-11-26 ENCOUNTER — OFFICE VISIT (OUTPATIENT)
Dept: FAMILY MEDICINE | Facility: CLINIC | Age: 19
End: 2024-11-26
Payer: COMMERCIAL

## 2024-11-26 ENCOUNTER — LAB VISIT (OUTPATIENT)
Dept: LAB | Facility: HOSPITAL | Age: 19
End: 2024-11-26
Payer: COMMERCIAL

## 2024-11-26 VITALS
HEART RATE: 60 BPM | SYSTOLIC BLOOD PRESSURE: 110 MMHG | WEIGHT: 230.69 LBS | RESPIRATION RATE: 16 BRPM | BODY MASS INDEX: 33.03 KG/M2 | DIASTOLIC BLOOD PRESSURE: 72 MMHG | HEIGHT: 70 IN

## 2024-11-26 DIAGNOSIS — J30.2 SEASONAL ALLERGIC RHINITIS, UNSPECIFIED TRIGGER: ICD-10-CM

## 2024-11-26 DIAGNOSIS — G89.29 CHRONIC MIDLINE THORACIC BACK PAIN: ICD-10-CM

## 2024-11-26 DIAGNOSIS — M54.6 CHRONIC MIDLINE THORACIC BACK PAIN: ICD-10-CM

## 2024-11-26 DIAGNOSIS — F90.0 ATTENTION DEFICIT HYPERACTIVITY DISORDER (ADHD), PREDOMINANTLY INATTENTIVE TYPE: ICD-10-CM

## 2024-11-26 DIAGNOSIS — M25.50 POLYARTHRALGIA: ICD-10-CM

## 2024-11-26 DIAGNOSIS — Z00.00 WELLNESS EXAMINATION: Primary | ICD-10-CM

## 2024-11-26 DIAGNOSIS — Z86.59 HISTORY OF ANXIETY: ICD-10-CM

## 2024-11-26 DIAGNOSIS — M54.2 CERVICALGIA: ICD-10-CM

## 2024-11-26 DIAGNOSIS — Z23 IMMUNIZATION DUE: ICD-10-CM

## 2024-11-26 DIAGNOSIS — Z00.00 WELLNESS EXAMINATION: ICD-10-CM

## 2024-11-26 PROBLEM — Z11.4 SCREENING FOR HIV (HUMAN IMMUNODEFICIENCY VIRUS): Status: RESOLVED | Noted: 2022-12-22 | Resolved: 2024-11-26

## 2024-11-26 PROBLEM — M62.81 MUSCLE WEAKNESS: Status: RESOLVED | Noted: 2024-07-30 | Resolved: 2024-11-26

## 2024-11-26 LAB
ALBUMIN SERPL BCP-MCNC: 4.4 G/DL (ref 3.5–5.2)
ALP SERPL-CCNC: 66 U/L (ref 40–150)
ALT SERPL W/O P-5'-P-CCNC: 23 U/L (ref 10–44)
ANION GAP SERPL CALC-SCNC: 9 MMOL/L (ref 8–16)
AST SERPL-CCNC: 23 U/L (ref 10–40)
BASOPHILS # BLD AUTO: 0.02 K/UL (ref 0–0.2)
BASOPHILS NFR BLD: 0.4 % (ref 0–1.9)
BILIRUB SERPL-MCNC: 0.4 MG/DL (ref 0.1–1)
BUN SERPL-MCNC: 16 MG/DL (ref 6–20)
CALCIUM SERPL-MCNC: 9.9 MG/DL (ref 8.7–10.5)
CHLORIDE SERPL-SCNC: 106 MMOL/L (ref 95–110)
CHOLEST SERPL-MCNC: 174 MG/DL (ref 120–199)
CHOLEST/HDLC SERPL: 4.7 {RATIO} (ref 2–5)
CO2 SERPL-SCNC: 26 MMOL/L (ref 23–29)
CREAT SERPL-MCNC: 1 MG/DL (ref 0.5–1.4)
CRP SERPL-MCNC: 0.5 MG/L (ref 0–8.2)
DIFFERENTIAL METHOD BLD: NORMAL
EOSINOPHIL # BLD AUTO: 0.2 K/UL (ref 0–0.5)
EOSINOPHIL NFR BLD: 3.4 % (ref 0–8)
ERYTHROCYTE [DISTWIDTH] IN BLOOD BY AUTOMATED COUNT: 11.7 % (ref 11.5–14.5)
ERYTHROCYTE [SEDIMENTATION RATE] IN BLOOD BY PHOTOMETRIC METHOD: <2 MM/HR (ref 0–23)
EST. GFR  (NO RACE VARIABLE): >60 ML/MIN/1.73 M^2
ESTIMATED AVG GLUCOSE: 91 MG/DL (ref 68–131)
GLUCOSE SERPL-MCNC: 99 MG/DL (ref 70–110)
HBA1C MFR BLD: 4.8 % (ref 4–5.6)
HCT VFR BLD AUTO: 45.5 % (ref 40–54)
HCV AB SERPL QL IA: NORMAL
HDLC SERPL-MCNC: 37 MG/DL (ref 40–75)
HDLC SERPL: 21.3 % (ref 20–50)
HGB BLD-MCNC: 15.4 G/DL (ref 14–18)
HIV 1+2 AB+HIV1 P24 AG SERPL QL IA: NORMAL
IMM GRANULOCYTES # BLD AUTO: 0.01 K/UL (ref 0–0.04)
IMM GRANULOCYTES NFR BLD AUTO: 0.2 % (ref 0–0.5)
LDLC SERPL CALC-MCNC: 108.4 MG/DL (ref 63–159)
LYMPHOCYTES # BLD AUTO: 1.5 K/UL (ref 1–4.8)
LYMPHOCYTES NFR BLD: 28.8 % (ref 18–48)
MCH RBC QN AUTO: 29.9 PG (ref 27–31)
MCHC RBC AUTO-ENTMCNC: 33.8 G/DL (ref 32–36)
MCV RBC AUTO: 88 FL (ref 82–98)
MONOCYTES # BLD AUTO: 0.6 K/UL (ref 0.3–1)
MONOCYTES NFR BLD: 11.1 % (ref 4–15)
NEUTROPHILS # BLD AUTO: 2.8 K/UL (ref 1.8–7.7)
NEUTROPHILS NFR BLD: 56.1 % (ref 38–73)
NONHDLC SERPL-MCNC: 137 MG/DL
NRBC BLD-RTO: 0 /100 WBC
PLATELET # BLD AUTO: 213 K/UL (ref 150–450)
PMV BLD AUTO: 9.9 FL (ref 9.2–12.9)
POTASSIUM SERPL-SCNC: 4.8 MMOL/L (ref 3.5–5.1)
PROT SERPL-MCNC: 7.2 G/DL (ref 6–8.4)
RBC # BLD AUTO: 5.15 M/UL (ref 4.6–6.2)
RHEUMATOID FACT SERPL-ACNC: <13 IU/ML (ref 0–15)
SODIUM SERPL-SCNC: 141 MMOL/L (ref 136–145)
TRIGL SERPL-MCNC: 143 MG/DL (ref 30–150)
TSH SERPL DL<=0.005 MIU/L-ACNC: 1.96 UIU/ML (ref 0.4–4)
URATE SERPL-MCNC: 6.3 MG/DL (ref 3.4–7)
WBC # BLD AUTO: 5.04 K/UL (ref 3.9–12.7)

## 2024-11-26 PROCEDURE — 90471 IMMUNIZATION ADMIN: CPT | Mod: S$GLB,,, | Performed by: FAMILY MEDICINE

## 2024-11-26 PROCEDURE — 86038 ANTINUCLEAR ANTIBODIES: CPT | Performed by: FAMILY MEDICINE

## 2024-11-26 PROCEDURE — 99395 PREV VISIT EST AGE 18-39: CPT | Mod: 25,S$GLB,, | Performed by: FAMILY MEDICINE

## 2024-11-26 PROCEDURE — 3074F SYST BP LT 130 MM HG: CPT | Mod: CPTII,S$GLB,, | Performed by: FAMILY MEDICINE

## 2024-11-26 PROCEDURE — 86431 RHEUMATOID FACTOR QUANT: CPT | Performed by: FAMILY MEDICINE

## 2024-11-26 PROCEDURE — 85025 COMPLETE CBC W/AUTO DIFF WBC: CPT | Performed by: FAMILY MEDICINE

## 2024-11-26 PROCEDURE — 80061 LIPID PANEL: CPT | Performed by: FAMILY MEDICINE

## 2024-11-26 PROCEDURE — 99999 PR PBB SHADOW E&M-EST. PATIENT-LVL III: CPT | Mod: PBBFAC,,, | Performed by: FAMILY MEDICINE

## 2024-11-26 PROCEDURE — 84550 ASSAY OF BLOOD/URIC ACID: CPT | Performed by: FAMILY MEDICINE

## 2024-11-26 PROCEDURE — 1160F RVW MEDS BY RX/DR IN RCRD: CPT | Mod: CPTII,S$GLB,, | Performed by: FAMILY MEDICINE

## 2024-11-26 PROCEDURE — 84443 ASSAY THYROID STIM HORMONE: CPT | Performed by: FAMILY MEDICINE

## 2024-11-26 PROCEDURE — 90715 TDAP VACCINE 7 YRS/> IM: CPT | Mod: S$GLB,,, | Performed by: FAMILY MEDICINE

## 2024-11-26 PROCEDURE — 86140 C-REACTIVE PROTEIN: CPT | Performed by: FAMILY MEDICINE

## 2024-11-26 PROCEDURE — 80053 COMPREHEN METABOLIC PANEL: CPT | Performed by: FAMILY MEDICINE

## 2024-11-26 PROCEDURE — 3078F DIAST BP <80 MM HG: CPT | Mod: CPTII,S$GLB,, | Performed by: FAMILY MEDICINE

## 2024-11-26 PROCEDURE — 85652 RBC SED RATE AUTOMATED: CPT | Performed by: FAMILY MEDICINE

## 2024-11-26 PROCEDURE — 3008F BODY MASS INDEX DOCD: CPT | Mod: CPTII,S$GLB,, | Performed by: FAMILY MEDICINE

## 2024-11-26 PROCEDURE — 87389 HIV-1 AG W/HIV-1&-2 AB AG IA: CPT | Performed by: FAMILY MEDICINE

## 2024-11-26 PROCEDURE — 86803 HEPATITIS C AB TEST: CPT | Performed by: FAMILY MEDICINE

## 2024-11-26 PROCEDURE — 83036 HEMOGLOBIN GLYCOSYLATED A1C: CPT | Performed by: FAMILY MEDICINE

## 2024-11-26 PROCEDURE — 1159F MED LIST DOCD IN RCRD: CPT | Mod: CPTII,S$GLB,, | Performed by: FAMILY MEDICINE

## 2024-11-26 RX ORDER — DEXTROAMPHETAMINE SACCHARATE, AMPHETAMINE ASPARTATE, DEXTROAMPHETAMINE SULFATE AND AMPHETAMINE SULFATE 2.5; 2.5; 2.5; 2.5 MG/1; MG/1; MG/1; MG/1
10 TABLET ORAL DAILY PRN
COMMUNITY
Start: 2023-05-01

## 2024-11-26 NOTE — PROGRESS NOTES
THIS DOCUMENT WAS MADE IN PART WITH VOICE RECOGNITION SOFTWARE.  OCCASIONALLY THIS SOFTWARE WILL MISINTERPRET WORDS OR PHRASES.    Assessment and Plan:    1. Wellness examination  CBC Auto Differential    Comprehensive Metabolic Panel    Lipid Panel    Hemoglobin A1C    TSH    Hepatitis C Antibody    HIV 1/2 Ag/Ab (4th Gen)    Urinalysis, Reflex to Urine Culture Urine, Clean Catch    Urinalysis Microscopic      2. Immunization due  DIPH,PERTUSS(ACEL),TET VAC(PF)(ADULT)(ADACEL)(TDaP)      3. Attention deficit hyperactivity disorder (ADHD), predominantly inattentive type        4. History of anxiety        5. Seasonal allergic rhinitis, unspecified trigger        6. Polyarthralgia  Sedimentation rate    C-Reactive Protein    Uric Acid    DARON Screen w/Reflex    Rheumatoid Factor    HLA B27 ANTIGEN      7. Cervicalgia  X-Ray Cervical Spine 5 View With Flex And Ext    X-Ray Thoracic Spine AP Lateral    [facility]ulatory referral[facility]      8. Chronic midline thoracic back pain  X-Ray Cervical Spine 5 View With Flex And Ext    X-Ray Thoracic Spine AP Lateral    Ambulatory referral/consult to [facility]            Assessment & Plan                  ______________________________________________________________________  Subjective:    Chief Complaint:  Chief Complaint   Patient presents with    Back Pain     Patient states he has been having upper back pain for several years, patient states it has worsen recently.        HPI:  Kedar is a 19 y.o. year old     History of Present Illness                    Anxiety   Previous Rx-Wellbutrin    ADD   Previous Rx-Adderall        Past Medical History:  Past Medical History:   Diagnosis Date    Anxiety     Central auditory processing disorder        Past Surgical History:  History reviewed. No pertinent surgical history.    Family History:  Family History   Problem Relation Name Age of Onset    Hyperlipidemia Mother      Strabismus Father       Heart disease Father      Alpha-1 antitrypsin deficiency Brother      Amblyopia Neg Hx      Blindness Neg Hx      Cancer Neg Hx      Cataracts Neg Hx      Diabetes Neg Hx      Glaucoma Neg Hx      Hypertension Neg Hx      Macular degeneration Neg Hx      Retinal detachment Neg Hx      Stroke Neg Hx      Thyroid disease Neg Hx         Social History:  Social History     Socioeconomic History    Marital status: Single   Tobacco Use    Smoking status: Never     Passive exposure: Never    Smokeless tobacco: Never   Substance and Sexual Activity    Alcohol use: No     Comment: socially    Drug use: No    Sexual activity: Never   Social History Narrative    Lives with parents.     Senior S     College : [employer] : Biology : Neuroscience : Research     Hobbies : None      Social Drivers of Health     Financial Resource Strain: Low Risk  (11/26/2024)    Overall Financial Resource Strain (CARDIA)     Difficulty of Paying Living Expenses: Not hard at all   Food Insecurity: No Food Insecurity (11/26/2024)    Hunger Vital Sign     Worried About Running Out of Food in the Last Year: Never true     Ran Out of Food in the Last Year: Never true   Physical Activity: Sufficiently Active (11/26/2024)    Exercise Vital Sign     Days of Exercise per Week: 5 days     Minutes of Exercise per Session: 70 min   Stress: No Stress Concern Present (11/26/2024)    [employer] [place] [employer] - Occupational Stress Questionnaire     Feeling of Stress : Only a little   Housing Stability: Unknown (11/26/2024)    Housing Stability Vital Sign     Unable to Pay for Housing in the Last Year: No       Medications:  Current Outpatient Medications on File Prior to Visit   Medication Sig Dispense Refill    dextroamphetamine-amphetamine 10 mg Tab Take 10 mg by mouth daily as needed.       No current facility-administered medications on file prior to visit.       Allergies:  Patient has no known  "allergies.    Immunizations:  Immunization History   Administered Date(s) Administered    DTaP 2003, 2003, 2003, 09/02/2004, 03/13/2007    HIB 2003, 2003, 03/10/2004    HPV 9-Valent 04/26/2016, 09/30/2016, 03/16/2018    Hepatitis A, Pediatric/Adolescent, 2 Dose 03/13/2007, 03/18/2008    Hepatitis B, Pediatric/Adolescent 2003, 2003, 2003, 03/10/2004    IPV 2003, 2003, 2003, 03/13/2007    Influenza 10/30/2006, 11/29/2011    Influenza - Intranasal - Trivalent 11/29/2011    Influenza - Quadrivalent - PF *Preferred* (6 months and older) 02/11/2021    Influenza - Trivalent - Afluria, Fluzone MDV 10/30/2006    MMR 09/02/2004    MMRV 03/13/2007    Meningococcal Conjugate (MCV4P) 06/24/2014, 11/05/2019    Pneumococcal Conjugate - 7 Valent 2003, 2003, 2003, 03/09/2005    Tdap 06/24/2014    Varicella 03/10/2004       Review of Systems:  Review of Systems   All other systems reviewed and are negative.      Objective:    Vitals:  Vitals:    11/26/24 0814   BP: 110/72   Pulse: 60   Resp: 16   Weight: 104.6 kg (230 lb 11.4 oz)   Height: 5' 10" (1.778 m)       Physical Exam  Vitals reviewed.   Constitutional:       General: He is not in acute distress.  HENT:      Head: Normocephalic and atraumatic.   Eyes:      Pupils: Pupils are equal, round, and reactive to light.   Cardiovascular:      Rate and Rhythm: Normal rate and regular rhythm.      Heart sounds: No murmur heard.     No friction rub.   Pulmonary:      Effort: Pulmonary effort is normal.      Breath sounds: Normal breath sounds.   Abdominal:      General: Bowel sounds are normal. There is no distension.      Palpations: Abdomen is soft.      Tenderness: There is no abdominal tenderness.   Musculoskeletal:      Cervical back: Neck supple.   Skin:     General: Skin is warm and dry.      Findings: No rash.   Psychiatric:         Behavior: Behavior normal.           Bill " MD [facility]

## 2024-11-27 LAB — ANA SER QL IF: NORMAL

## 2024-12-09 ENCOUNTER — PATIENT MESSAGE (OUTPATIENT)
Dept: PAIN MEDICINE | Facility: CLINIC | Age: 19
End: 2024-12-09
Payer: COMMERCIAL

## 2025-03-18 ENCOUNTER — PATIENT MESSAGE (OUTPATIENT)
Dept: ORTHOPEDICS | Facility: CLINIC | Age: 20
End: 2025-03-18
Payer: COMMERCIAL

## 2025-05-09 ENCOUNTER — TELEPHONE (OUTPATIENT)
Dept: ORTHOPEDICS | Facility: CLINIC | Age: 20
End: 2025-05-09
Payer: COMMERCIAL

## 2025-05-09 DIAGNOSIS — M24.80 GENERALIZED ARTICULAR HYPERMOBILITY: Primary | ICD-10-CM
